# Patient Record
Sex: MALE | Race: WHITE | Employment: FULL TIME | URBAN - METROPOLITAN AREA
[De-identification: names, ages, dates, MRNs, and addresses within clinical notes are randomized per-mention and may not be internally consistent; named-entity substitution may affect disease eponyms.]

---

## 2019-05-16 ENCOUNTER — MEDICAL CORRESPONDENCE (OUTPATIENT)
Dept: HEALTH INFORMATION MANAGEMENT | Facility: CLINIC | Age: 59
End: 2019-05-16

## 2019-05-16 DIAGNOSIS — I25.3 ATRIAL SEPTAL ANEURYSM: Primary | ICD-10-CM

## 2019-05-31 ENCOUNTER — HOSPITAL ENCOUNTER (OUTPATIENT)
Dept: CARDIOLOGY | Facility: CLINIC | Age: 59
Discharge: HOME | End: 2019-05-31
Attending: PHYSICIAN ASSISTANT | Admitting: PHYSICIAN ASSISTANT
Payer: COMMERCIAL

## 2019-05-31 DIAGNOSIS — I25.3 ATRIAL SEPTAL ANEURYSM: ICD-10-CM

## 2019-05-31 PROCEDURE — 93018 CV STRESS TEST I&R ONLY: CPT | Performed by: INTERNAL MEDICINE

## 2019-05-31 PROCEDURE — 25500064 ZZH RX 255 OP 636: Performed by: INTERNAL MEDICINE

## 2019-05-31 PROCEDURE — 40000264 ECHO STRESS ECHOCARDIOGRAM

## 2019-05-31 PROCEDURE — 93016 CV STRESS TEST SUPVJ ONLY: CPT | Performed by: INTERNAL MEDICINE

## 2019-05-31 PROCEDURE — 93321 DOPPLER ECHO F-UP/LMTD STD: CPT | Mod: 26 | Performed by: INTERNAL MEDICINE

## 2019-05-31 PROCEDURE — 93325 DOPPLER ECHO COLOR FLOW MAPG: CPT | Mod: 26 | Performed by: INTERNAL MEDICINE

## 2019-05-31 PROCEDURE — 93350 STRESS TTE ONLY: CPT | Mod: 26 | Performed by: INTERNAL MEDICINE

## 2019-05-31 RX ADMIN — HUMAN ALBUMIN MICROSPHERES AND PERFLUTREN 9 ML: 10; .22 INJECTION, SOLUTION INTRAVENOUS at 15:30

## 2021-02-10 ENCOUNTER — THERAPY VISIT (OUTPATIENT)
Dept: PHYSICAL THERAPY | Facility: CLINIC | Age: 61
End: 2021-02-10
Payer: COMMERCIAL

## 2021-02-10 DIAGNOSIS — M25.561 RIGHT KNEE PAIN: ICD-10-CM

## 2021-02-10 PROCEDURE — 97161 PT EVAL LOW COMPLEX 20 MIN: CPT | Mod: GP | Performed by: PHYSICAL THERAPIST

## 2021-02-10 PROCEDURE — 97110 THERAPEUTIC EXERCISES: CPT | Mod: GP | Performed by: PHYSICAL THERAPIST

## 2021-02-10 ASSESSMENT — ACTIVITIES OF DAILY LIVING (ADL)
GO DOWN STAIRS: ACTIVITY IS NOT DIFFICULT
RISE FROM A CHAIR: ACTIVITY IS FAIRLY DIFFICULT
KNEE_ACTIVITY_OF_DAILY_LIVING_SCORE: 75.71
SIT WITH YOUR KNEE BENT: ACTIVITY IS FAIRLY DIFFICULT
HOW_WOULD_YOU_RATE_THE_CURRENT_FUNCTION_OF_YOUR_KNEE_DURING_YOUR_USUAL_DAILY_ACTIVITIES_ON_A_SCALE_FROM_0_TO_100_WITH_100_BEING_YOUR_LEVEL_OF_KNEE_FUNCTION_PRIOR_TO_YOUR_INJURY_AND_0_BEING_THE_INABILITY_TO_PERFORM_ANY_OF_YOUR_USUAL_DAILY_ACTIVITIES?: 75
SWELLING: I DO NOT HAVE THE SYMPTOM
RAW_SCORE: 53
GO UP STAIRS: ACTIVITY IS SOMEWHAT DIFFICULT
AS_A_RESULT_OF_YOUR_KNEE_INJURY,_HOW_WOULD_YOU_RATE_YOUR_CURRENT_LEVEL_OF_DAILY_ACTIVITY?: NEARLY NORMAL
STIFFNESS: I DO NOT HAVE THE SYMPTOM
STAND: ACTIVITY IS NOT DIFFICULT
LIMPING: I HAVE THE SYMPTOM BUT IT DOES NOT AFFECT MY ACTIVITY
SQUAT: ACTIVITY IS MINIMALLY DIFFICULT
WEAKNESS: THE SYMPTOM AFFECTS MY ACTIVITY SLIGHTLY
PAIN: THE SYMPTOM AFFECTS MY ACTIVITY MODERATELY
WALK: ACTIVITY IS NOT DIFFICULT
KNEE_ACTIVITY_OF_DAILY_LIVING_SUM: 53
KNEEL ON THE FRONT OF YOUR KNEE: ACTIVITY IS SOMEWHAT DIFFICULT
GIVING WAY, BUCKLING OR SHIFTING OF KNEE: I DO NOT HAVE THE SYMPTOM
HOW_WOULD_YOU_RATE_THE_OVERALL_FUNCTION_OF_YOUR_KNEE_DURING_YOUR_USUAL_DAILY_ACTIVITIES?: ABNORMAL

## 2021-02-10 NOTE — LETTER
[facility] [place]  [id number]  [facility] [address]  [phone]    2021    Re: Jean Huerta   :   1960  MRN:  [id number]   REFERRING PHYSICIAN:   Anastacio Fragoso    [facility] [place] [facility]    Date of Initial Evaluation:  2-10-21  Visits:  Rxs Used: 1  Reason for Referral:  Right knee pain    EVALUATION SUMMARY    [facility] Initial Evaluation  Subjective:  Patient reports onset of right anterior medial knee pain 2020.  He had taken a driving trip to [place] from [place] had mild soreness potentially on the way and had significantly more soreness on the way back.  He has aching tightness in anterior superior patellar area that spreads to more burning tightness and distal quad and he has sitting continues.  He has aching stiffness and cracking and catching as he moves sit to stand that he has been sitting for longer than a few minutes.  He reports x-ray revealed mild osteoarthritis in right knee that he had had not been limited with activities prior to 2020.  Further historical review patient reports during his trip in 2020 he did have an injury to his right hip low back where he was bending and lifting a bed and felt a sharp grabbing pain that was stiff into his lateral hip and lateral buttock.  As he was sitting for car ride home he also notes that area was painful as well.  He is limited with sitting, transitional motions, positioning to sleep, squatting and lifting.  He is able to walk or run on the treadmill for 30 to 45 minutes with right knee warming up his feels tightness into his right lower back and some increase in left Achilles area pain.    The history is provided by the patient.   Pertinent medical history includes: asthma and concussions/dizziness.   Red flags:  Pain at rest/night.  Surgeries include:  Other (Kidney stones).     Current medications:  Pain medication (Statin, & asthma meds).    Current occupation .   Primary job tasks include:  Computer work and prolonged sitting.                   Objective:  Standing Alignment:    Lumbar:  Lordosis decr  General deviations alignment: Decreased weightbearing right increased right knee flexion trunk lean left.  Gait:    Gait Type:  Antalgic       Re: Jean Huerta   :   1960    Deviations:  Knee:  Knee extension decr RAnkle:  Push off decr RGeneral Deviations:  Stance time decr and stride length decr  Non-Weight Bearing:    Pelvis:  ASIS high L  Flexibility/Screens:   Positive screens:  Hip (Moderate loss left hip ER firm end feel right hip ER minimal loss with mild reproduction of right medial knee pain) and Lumbar (Maximal loss lumbar extension, maximal loss right single-leg stance lumbar extension with trunk rotation substitution reproduction of right anterior medial knee pain)  Lower Extremity:  Decreased left lower extremity flexibility:Hip IR's; Hip ER's; Piriformis; Hip Flexors and Quadriceps  Decreased right lower extremity flexibility:  Hip IR's; Hip ER's; Adductors; Piriformis; Hip Flexors; IT Band and Quadriceps  Spine:  Decreased left spine flexibility:  Quadratus Lumborum  Decreased right spine flexibility:  Quadratus Lumborum       Hip Evaluation  Hip Palpation:    Right hip tenderness present at:  IT Band; hip flexors; PSIS and ASIS     Knee Evaluation:  ROM:    PROM    Flexion: Left:   Right:  Minimal loss with mild increased medial patellar distal quad area pain  Endfeel: Springy end feel right without reproduction of superior medial knee pain approximately -5 degrees extension versus left    Palpation:    Right knee tenderness present at:  Patellar Tendon; Patellar Medial and Patellar Superior  Edema:  Edema of the knee: Mild edema superior right patellar.    General   ROS    Assessment/Plan:    Patient is a 60 year old male with right side knee  complaints.    Patient has the following significant findings with corresponding treatment plan.                Diagnosis 1: Right knee pain Pain -  hot/cold therapy, manual therapy and self management  Decreased ROM/flexibility - manual therapy and therapeutic exercise  Decreased joint mobility - manual therapy and therapeutic exercise  Edema - cold therapy  Impaired gait - gait training  Impaired muscle performance - neuro re-education  Decreased function - therapeutic activities  Impaired posture - neuro re-education    Re: Jean Huerta   :   1960    Therapy Evaluation Codes:   1) History comprised of:   Personal factors that impact the plan of care:      None.    Comorbidity factors that impact the plan of care are:      None.     Medications impacting care: None.  2) Examination of Body Systems comprised of:   Body structures and functions that impact the plan of care:      Hip, Knee, Lumbar spine and Pelvis.   Activity limitations that impact the plan of care are:      Bending, Driving, Dressing, Lifting, Running, Sitting, Sports,            Squatting/kneeling, Stairs, Standing, Walking and Working.  3) Clinical presentation characteristics are:   Stable/Uncomplicated.  4) Decision-Making    Low complexity using standardized patient assessment instrument and/or measureable assessment of functional outcome.  Cumulative Therapy Evaluation is: Low complexity.    Communication ability:  Patient appears to be able to clearly communicate and understand verbal and written communication and follow directions correctly.  Treatment Explanation - The following has been discussed with the patient:   RX ordered/plan of care  Anticipated outcomes  Possible risks and side effects  This patient would benefit from PT intervention to resume normal activities.   Rehab potential is excellent.  Frequency:  1 X week, once daily  Duration:  for 6 weeks  Discharge Plan:  Achieve all LTG.  Independent in home treatment  program.  Reach maximal therapeutic benefit.    Thank you for your referral.    INQUIRIES  Therapist: Kenisha Mattson PT, [facility] [place] PH[facility]ICAL [facility]ERAP[facility]  [address] [place] [address] [id number]  [facility] [address]  Phone: [phone]  Fax: [phone]

## 2021-02-10 NOTE — PROGRESS NOTES
[facility] Initial Evaluation  Subjective:  Patient reports onset of right anterior medial knee pain October 2020.  He had taken a driving trip to [place] from [place] had mild soreness potentially on the way and had significantly more soreness on the way back.  He has aching tightness in anterior superior patellar area that spreads to more burning tightness and distal quad and he has sitting continues.  He has aching stiffness and cracking and catching as he moves sit to stand that he has been sitting for longer than a few minutes.  He reports x-ray revealed mild osteoarthritis in right knee that he had had not been limited with activities prior to October 2020.  Further historical review patient reports during his trip in October 2020 he did have an injury to his right hip low back where he was bending and lifting a bed and felt a sharp grabbing pain that was stiff into his lateral hip and lateral buttock.  As he was sitting for car ride home he also notes that area was painful as well.  He is limited with sitting, transitional motions, positioning to sleep, squatting and lifting.  He is able to walk or run on the treadmill for 30 to 45 minutes with right knee warming up his feels tightness into his right lower back and some increase in left Achilles area pain.    The history is provided by the patient.                       Objective:  Standing Alignment:        Lumbar:  Lordosis decr          General deviations alignment: Decreased weightbearing right increased right knee flexion trunk lean left.  Gait:    Gait Type:  Antalgic     Deviations:  Knee:  Knee extension decr RAnkle:  Push off decr RGeneral Deviations:  Stance time decr and stride length decr  Non-Weight Bearing:    Pelvis:  ASIS high L        Flexibility/Screens:   Positive screens:  Hip (Moderate loss left hip ER firm end feel right hip ER minimal loss with mild reproduction of right medial knee pain) and Lumbar  (Maximal loss lumbar extension, maximal loss right single-leg stance lumbar extension with trunk rotation substitution reproduction of right anterior medial knee pain)    Lower Extremity:  Decreased left lower extremity flexibility:Hip IR's; Hip ER's; Piriformis; Hip Flexors and Quadriceps    Decreased right lower extremity flexibility:  Hip IR's; Hip ER's; Adductors; Piriformis; Hip Flexors; IT Band and Quadriceps  Spine:  Decreased left spine flexibility:  Quadratus Lumborum    Decreased right spine flexibility:  Quadratus Lumborum                                               Hip Evaluation        Hip Palpation:      Right hip tenderness present at:  IT Band; hip flexors; PSIS and ASIS       Knee Evaluation:  ROM:      PROM        Flexion: Left:   Right:  Minimal loss with mild increased medial patellar distal quad area pain    Endfeel: Springy end feel right without reproduction of superior medial knee pain approximately -5 degrees extension versus left        Palpation:      Right knee tenderness present at:  Patellar Tendon; Patellar Medial and Patellar Superior  Edema:  Edema of the knee: Mild edema superior right patellar.            General     ROS    Assessment/Plan:    Patient is a 60 year old male with right side knee complaints.    Patient has the following significant findings with corresponding treatment plan.                Diagnosis 1: Right knee pain Pain -  hot/cold therapy, manual therapy and self management  Decreased ROM/flexibility - manual therapy and therapeutic exercise  Decreased joint mobility - manual therapy and therapeutic exercise  Edema - cold therapy  Impaired gait - gait training  Impaired muscle performance - neuro re-education  Decreased function - therapeutic activities  Impaired posture - neuro re-education    Therapy Evaluation Codes:   1) History comprised of:   Personal factors that impact the plan of care:      None.    Comorbidity factors that impact the plan of care are:       None.     Medications impacting care: None.  2) Examination of Body Systems comprised of:   Body structures and functions that impact the plan of care:      Hip, Knee, Lumbar spine and Pelvis.   Activity limitations that impact the plan of care are:      Bending, Driving, Dressing, Lifting, Running, Sitting, Sports, Squatting/kneeling, Stairs, Standing, Walking and Working.  3) Clinical presentation characteristics are:   Stable/Uncomplicated.  4) Decision-Making    Low complexity using standardized patient assessment instrument and/or measureable assessment of functional outcome.  Cumulative Therapy Evaluation is: Low complexity.    Previous and current functional limitations:  (See Goal Flow Sheet for this information)    Short term and Long term goals: (See Goal Flow Sheet for this information)     Communication ability:  Patient appears to be able to clearly communicate and understand verbal and written communication and follow directions correctly.  Treatment Explanation - The following has been discussed with the patient:   RX ordered/plan of care  Anticipated outcomes  Possible risks and side effects  This patient would benefit from PT intervention to resume normal activities.   Rehab potential is excellent.    Frequency:  1 X week, once daily  Duration:  for 6 weeks  Discharge Plan:  Achieve all LTG.  Independent in home treatment program.  Reach maximal therapeutic benefit.    Please refer to the daily flowsheet for treatment today, total treatment time and time spent performing 1:1 timed codes.

## 2021-02-11 NOTE — PROGRESS NOTES
[facility] Initial Evaluation  Subjective:    Patient Health History             Pertinent medical history includes: asthma and concussions/dizziness.   Red flags:  Pain at rest/night.     Surgeries include:  Other (Kidney stones).    Current medications:  Pain medication (Statin, & asthma meds).    Current occupation .   Primary job tasks include:  Computer work and prolonged sitting.                                    Objective:  System    Physical Exam    General     ROS    Assessment/Plan:

## 2021-02-17 ENCOUNTER — THERAPY VISIT (OUTPATIENT)
Dept: PHYSICAL THERAPY | Facility: CLINIC | Age: 61
End: 2021-02-17
Payer: COMMERCIAL

## 2021-02-17 DIAGNOSIS — M25.561 RIGHT KNEE PAIN: ICD-10-CM

## 2021-02-17 PROCEDURE — 97140 MANUAL THERAPY 1/> REGIONS: CPT | Mod: GP | Performed by: PHYSICAL THERAPIST

## 2021-02-17 PROCEDURE — 97110 THERAPEUTIC EXERCISES: CPT | Mod: GP | Performed by: PHYSICAL THERAPIST

## 2021-02-24 ENCOUNTER — THERAPY VISIT (OUTPATIENT)
Dept: PHYSICAL THERAPY | Facility: CLINIC | Age: 61
End: 2021-02-24
Payer: COMMERCIAL

## 2021-02-24 DIAGNOSIS — M25.561 RIGHT KNEE PAIN: ICD-10-CM

## 2021-02-24 PROCEDURE — 97140 MANUAL THERAPY 1/> REGIONS: CPT | Mod: GP | Performed by: PHYSICAL THERAPIST

## 2021-02-24 PROCEDURE — 97110 THERAPEUTIC EXERCISES: CPT | Mod: GP | Performed by: PHYSICAL THERAPIST

## 2021-03-03 ENCOUNTER — THERAPY VISIT (OUTPATIENT)
Dept: PHYSICAL THERAPY | Facility: CLINIC | Age: 61
End: 2021-03-03
Payer: COMMERCIAL

## 2021-03-03 DIAGNOSIS — M25.561 RIGHT KNEE PAIN: ICD-10-CM

## 2021-03-03 PROCEDURE — 97140 MANUAL THERAPY 1/> REGIONS: CPT | Mod: GP | Performed by: PHYSICAL THERAPIST

## 2021-03-03 PROCEDURE — 97110 THERAPEUTIC EXERCISES: CPT | Mod: GP | Performed by: PHYSICAL THERAPIST

## 2021-03-10 ENCOUNTER — THERAPY VISIT (OUTPATIENT)
Dept: PHYSICAL THERAPY | Facility: CLINIC | Age: 61
End: 2021-03-10
Payer: COMMERCIAL

## 2021-03-10 DIAGNOSIS — M25.561 RIGHT KNEE PAIN: ICD-10-CM

## 2021-03-10 PROCEDURE — 97110 THERAPEUTIC EXERCISES: CPT | Mod: GP | Performed by: PHYSICAL THERAPIST

## 2021-03-10 PROCEDURE — 97140 MANUAL THERAPY 1/> REGIONS: CPT | Mod: GP | Performed by: PHYSICAL THERAPIST

## 2021-03-25 ENCOUNTER — THERAPY VISIT (OUTPATIENT)
Dept: PHYSICAL THERAPY | Facility: CLINIC | Age: 61
End: 2021-03-25
Payer: COMMERCIAL

## 2021-03-25 DIAGNOSIS — M25.561 RIGHT KNEE PAIN: ICD-10-CM

## 2021-03-25 PROCEDURE — 97110 THERAPEUTIC EXERCISES: CPT | Mod: GP | Performed by: PHYSICAL THERAPIST

## 2021-03-25 PROCEDURE — 97112 NEUROMUSCULAR REEDUCATION: CPT | Mod: GP | Performed by: PHYSICAL THERAPIST

## 2021-03-25 PROCEDURE — 97140 MANUAL THERAPY 1/> REGIONS: CPT | Mod: GP | Performed by: PHYSICAL THERAPIST

## 2021-05-15 ENCOUNTER — HEALTH MAINTENANCE LETTER (OUTPATIENT)
Age: 61
End: 2021-05-15

## 2021-08-11 ENCOUNTER — TRANSFERRED RECORDS (OUTPATIENT)
Dept: HEALTH INFORMATION MANAGEMENT | Facility: CLINIC | Age: 61
End: 2021-08-11

## 2021-08-11 ENCOUNTER — MEDICAL CORRESPONDENCE (OUTPATIENT)
Dept: HEALTH INFORMATION MANAGEMENT | Facility: CLINIC | Age: 61
End: 2021-08-11

## 2021-08-11 DIAGNOSIS — I25.3 ATRIAL SEPTAL ANEURYSM: ICD-10-CM

## 2021-08-11 DIAGNOSIS — R94.31 ACUTE ELECTROCARDIOGRAM CHANGES: Primary | ICD-10-CM

## 2021-09-04 ENCOUNTER — HEALTH MAINTENANCE LETTER (OUTPATIENT)
Age: 61
End: 2021-09-04

## 2021-09-10 ENCOUNTER — HOSPITAL ENCOUNTER (OUTPATIENT)
Dept: CARDIOLOGY | Facility: CLINIC | Age: 61
Discharge: HOME | End: 2021-09-10
Attending: PHYSICIAN ASSISTANT | Admitting: PHYSICIAN ASSISTANT
Payer: COMMERCIAL

## 2021-09-10 DIAGNOSIS — R94.31 ACUTE ELECTROCARDIOGRAM CHANGES: ICD-10-CM

## 2021-09-10 DIAGNOSIS — I25.3 ATRIAL SEPTAL ANEURYSM: ICD-10-CM

## 2021-09-10 PROCEDURE — 93321 DOPPLER ECHO F-UP/LMTD STD: CPT | Mod: TC

## 2021-09-10 PROCEDURE — 93350 STRESS TTE ONLY: CPT | Mod: 26 | Performed by: INTERNAL MEDICINE

## 2021-09-10 PROCEDURE — 93016 CV STRESS TEST SUPVJ ONLY: CPT | Performed by: INTERNAL MEDICINE

## 2021-09-10 PROCEDURE — 93018 CV STRESS TEST I&R ONLY: CPT | Performed by: INTERNAL MEDICINE

## 2021-09-10 PROCEDURE — 93321 DOPPLER ECHO F-UP/LMTD STD: CPT | Mod: 26 | Performed by: INTERNAL MEDICINE

## 2021-09-10 PROCEDURE — 93325 DOPPLER ECHO COLOR FLOW MAPG: CPT | Mod: 26 | Performed by: INTERNAL MEDICINE

## 2021-09-10 PROCEDURE — 93350 STRESS TTE ONLY: CPT | Mod: TC

## 2021-11-14 PROBLEM — M25.561 RIGHT KNEE PAIN: Status: RESOLVED | Noted: 2021-02-10 | Resolved: 2021-11-14

## 2021-11-14 NOTE — PROGRESS NOTES
DISCHARGE REPORT    Jean did not return for further treatment after the last visit on 3/25/21.   Current status is unknown.  Please see information below for last known relevant information.  Patient seen for   visits.    SUBJECTIVE  Subjective changes noted by patient:  Patient describes symptoms as appearing to be functional improvement.  He does sometimes feels comfortable sitting and also no symptoms on the stairs.  He he is sitting for 30 minutes and has superior lateral patellar lateral and distal lateral quad tightness that causes him to shift and changes position and have pain when he is moving sit to stand.  He also has some tightness descending stairs.  He does have pain that wakes him at night if he is not taking medication.  He does note that he sits prior to going to bed and then feels like he sleeps with his knee flexed as well.  We will attempt to have him restore full mobility prior to going to bed as well as being attentive to proximal deep core stability as he does transitional motions.  He was able to reduce anterior knee pain in a partial squat with attention to transverse abdominis and glutes activation through that range of motion.  .  Current pain level is  .     Previous pain level was   .   Changes in function: (See Goal flowsheet attached for changes in current functional level)  Adverse reaction to treatment or activity: None    OBJECTIVE  Changes noted in objective findings:   Minimal loss standing lumbar extension, moderate restriction VL/ITB, moderate restriction proximal rectus femoris PROM left hip minimal loss ER posttreatment moderate restriction deep lateral hip rotators,     ASSESSMENT/PLAN  Diagnosis: Right knee pain   Updated problem list and treatment plan:  Patient will continue to utilize HEP for any remaining deficits.   STG/LTGs have been met or progress has been made towards goals:  Please see goal flowsheet for most current information  Assessment of Progress: current  status is unknown.    Last current status:  (Apparent plateaued functional progression overall improvemen)   Self Management Plans:  HEP  I have re-evaluated this patient and find that the nature, scope, duration and intensity of the therapy is appropriate for the medical condition of the patient.  Jean continues to require the following intervention to meet STG and LTG's:  HEP.    Recommendations:  Discharge with current home program.  Patient to follow up with MD as needed.    Please refer to the daily flowsheet for treatment today, total treatment time and time spent performing 1:1 timed codes.

## 2022-06-11 ENCOUNTER — HEALTH MAINTENANCE LETTER (OUTPATIENT)
Age: 62
End: 2022-06-11

## 2022-10-16 ENCOUNTER — HEALTH MAINTENANCE LETTER (OUTPATIENT)
Age: 62
End: 2022-10-16

## 2023-06-17 ENCOUNTER — HEALTH MAINTENANCE LETTER (OUTPATIENT)
Age: 63
End: 2023-06-17

## 2024-08-10 ENCOUNTER — HEALTH MAINTENANCE LETTER (OUTPATIENT)
Age: 64
End: 2024-08-10